# Patient Record
Sex: FEMALE | Race: WHITE | NOT HISPANIC OR LATINO | URBAN - NONMETROPOLITAN AREA
[De-identification: names, ages, dates, MRNs, and addresses within clinical notes are randomized per-mention and may not be internally consistent; named-entity substitution may affect disease eponyms.]

---

## 2018-12-28 NOTE — PATIENT DISCUSSION
New Prescription: erythromycin (erythromycin): ointment: 5 mg/gram (0.5 %) 1 a thin layer three times a day as directed into affected eye

## 2018-12-28 NOTE — PATIENT DISCUSSION
- Follow up in 3 weeks.  If no improvement strongly consider I&amp;D (would have to be external approach) or excisional biopsy

## 2018-12-28 NOTE — PATIENT DISCUSSION
New Prescription: Maxitrol (neomycin-polymyxin-dexameth): ointment: 3.5-10,000-0.1 mg-unit/g-% a small amount three times a day as directed into left eye 12-

## 2019-06-07 ENCOUNTER — IMPORTED ENCOUNTER (OUTPATIENT)
Dept: URBAN - NONMETROPOLITAN AREA CLINIC 1 | Facility: CLINIC | Age: 69
End: 2019-06-07

## 2019-06-07 PROBLEM — H01.021: Noted: 2019-06-07

## 2019-06-07 PROBLEM — H10.423: Noted: 2019-06-07

## 2019-06-07 PROBLEM — H43.813: Noted: 2018-12-07

## 2019-06-07 PROBLEM — H35.373: Noted: 2017-11-10

## 2019-06-07 PROBLEM — H16.223: Noted: 2019-06-07

## 2019-06-07 PROBLEM — H40.013: Noted: 2017-11-10

## 2019-06-07 PROCEDURE — 92250 FUNDUS PHOTOGRAPHY W/I&R: CPT

## 2019-06-07 PROCEDURE — 92014 COMPRE OPH EXAM EST PT 1/>: CPT

## 2019-06-07 NOTE — PATIENT DISCUSSION
Glaucoma suspect OU:-  Educated patient on condition-  Optos done today stable findings OU -  VF done previously OD reliable with inferior nasal step and OS reliable with inferior arcuate but stable from previous-  OCT done previously OD shows Superior NFL thinning and Borderline Inferior NFL thinning and OS shows Superior NFL thinning but stable from previous -  Gonio done previous grade IV with moderate pigment OU-  PACH done previously still noted to have thick corneas OU.  -  IOP 14 OU -  Cup to Disc noted at 0.65 OU -  Continue to monitor  ERM OU-  Educated patietn on condition-  Continue to use [employer] daily call or come in ASAP if changes in vision are noted from [facility] [place] [place]-  OCT done previously shows ERM OU but stable from previous-  Optos done today stable OU-  Continue to monitorPVD OU: -  Discussed findings of exam in detail with the patient. -  Patient has noted more floaters but no curtains or additional flashes of light. -  No holes tears or detachments noted on exam today (flat 360). -  The risk of retinal detachment in patients with PVDs was discussed with the patient and the warning signs of retinal detachment were carefully reviewed with the patient. -  The patient was warned to return to the office or contact the ophthalmologist on call immediately if they experience signs of retinal detachment or changes in vision noted from today.  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- Discussed diagnosis in detail with patient- Discussed signs and symptoms of progression- Recommend patient drinking plenty of water and starting Omega 3’s - Recommend Refresh or Systane  throughout the day- Consider Restasis or plugs in the future if no improvement- Samples of Refresh Hipolito 3's given today- Recommend J & J baby shampoo to scrub lids daily - Continue to monitorDermatchalasis OU-  Educated patient on condition-  No superior field of vision complaint at this time-  Continue to monitor for nowPseudophakia OU-  Discussed diagnosis in detail w/ pt today-  Stable and doing well-  s/p Yag PC OU open capsule noted-  Monitor yearly or PRNOcular Allergies/ANGEL OU-  Educated patient on condition. -  Signs/symptoms associated discussed-  Continue Alrex OU BID PRN handwritten Rx given today-  Continue Refresh PF at least BID OU-  Continue to monitor at 6 mo or if patient has problems prn.; 's Notes: RM  6/1/18 DEFERS 6/7/19DFE  11/10/17OCT disc 12/7/18OCT mac 12/7/18Optos 6/7/19VF  12/7/18Gonio 6/1/18PACH  11/15/16 - Lynchstereo disc

## 2019-09-16 ENCOUNTER — IMPORTED ENCOUNTER (OUTPATIENT)
Dept: URBAN - NONMETROPOLITAN AREA CLINIC 1 | Facility: CLINIC | Age: 69
End: 2019-09-16

## 2019-09-16 PROCEDURE — 92015 DETERMINE REFRACTIVE STATE: CPT

## 2019-09-16 NOTE — PATIENT DISCUSSION
Refraction Only - Discussed diagnosis in detail with patient- New glasses RX given today - Continue to monitor - RTC A/S  ------------------------------------previous notes---------------------------Glaucoma suspect OU:-  Educated patient on condition-  Optos done today stable findings OU -  VF done previously OD reliable with inferior nasal step and OS reliable with inferior arcuate but stable from previous-  OCT done previously OD shows Superior NFL thinning and Borderline Inferior NFL thinning and OS shows Superior NFL thinning but stable from previous -  Gonio done previous grade IV with moderate pigment OU-  PACH done previously still noted to have thick corneas OU.  -  IOP 14 OU -  Cup to Disc noted at 0.65 OU -  Continue to monitor  ERM OU-  Educated patietn on condition-  Continue to use [employer] daily call or come in ASAP if changes in vision are noted from [facility]-  OCT done previously shows ERM OU but stable from previous-  Optos done today stable OU-  Continue to monitorPVD OU: -  Discussed findings of exam in detail with the patient. -  Patient has noted more floaters but no curtains or additional flashes of light. -  No holes tears or detachments noted on exam today (flat 360). -  The risk of retinal detachment in patients with PVDs was discussed with the patient and the warning signs of retinal detachment were carefully reviewed with the patient. -  The patient was warned to return to the office or contact the ophthalmologist on call immediately if they experience signs of retinal detachment or changes in vision noted from today.  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- Discussed diagnosis in detail with patient- Discussed signs and symptoms of progression- Recommend patient drinking plenty of water and starting Omega 3’s - Recommend Refresh or Systane  throughout the day- Consider Restasis or plugs in the future if no improvement- Samples of Refresh Hipolito 3's given today- Recommend J & J baby shampoo to scrub lids daily - Continue to monitorDermatchalasis OU-  Educated patient on condition-  No superior field of vision complaint at this time-  Continue to monitor for nowPseudophakia OU-  Discussed diagnosis in detail w/ pt today-  Stable and doing well-  s/p Yag PC OU open capsule noted-  Monitor yearly or PRNOcular Allergies/ANGEL OU-  Educated patient on condition. -  Signs/symptoms associated discussed-  Continue Alrex OU BID PRN handwritten Rx given today-  Continue Refresh PF at least BID OU-  Continue to monitor at 6 mo or if patient has problems prn.; 's Notes: RM  6/1/18 DEFERS 6/7/19DFE  11/10/17OCT disc 12/7/18OCT mac 12/7/18Optos 6/7/19VF  12/7/18Gonio 6/1/18PACH  11/15/16 - Lynchstereo disc

## 2019-12-09 ENCOUNTER — IMPORTED ENCOUNTER (OUTPATIENT)
Dept: URBAN - NONMETROPOLITAN AREA CLINIC 1 | Facility: CLINIC | Age: 69
End: 2019-12-09

## 2019-12-09 PROCEDURE — 92133 CPTRZD OPH DX IMG PST SGM ON: CPT

## 2019-12-09 PROCEDURE — 92014 COMPRE OPH EXAM EST PT 1/>: CPT

## 2019-12-09 PROCEDURE — 92083 EXTENDED VISUAL FIELD XM: CPT

## 2019-12-09 NOTE — PATIENT DISCUSSION
Glaucoma suspect OU:-  Educated patient on condition-  Brother has glaucoma -  Optos done previously stable findings OU -  VF done today OD shows Fair field with Inferior Arcuate and OS shows Fair field with Inferior Arcuate Defect. OS shows slight progression-  OCT done today shows Superior NFL thinning. Shows progression -  Gonio done previous grade IV with moderate pigment OU-  PACH done previously still noted to have thick corneas OU.  -  IOP 14 OU -  Cup to Disc noted at 0.65 OU -  Will consider drop if any changes in pressures or testing -  Continue to monitor  -  RTC 4 months BL GL with Optos and Complete ERM OU-  Educated patietn on condition-  Continue to use [employer] daily call or come in ASAP if changes in vision are noted from [place]-  OCT done previously shows ERM OU but stable from previous-  Optos done previously stable OU-  Continue to monitorPVD OU: -  Discussed findings of exam in detail with the patient. -  Patient has noted more floaters but no curtains or additional flashes of light. -  No holes tears or detachments noted on exam today (flat 360). -  The risk of retinal detachment in patients with PVDs was discussed with the patient and the warning signs of retinal detachment were carefully reviewed with the patient. -  The patient was warned to return to the office or contact the ophthalmologist on call immediately if they experience signs of retinal detachment or changes in vision noted from today.  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- Discussed diagnosis in detail with patient- Discussed signs and symptoms of progression- Recommend patient drinking plenty of water and starting Omega 3’s - Recommend Refresh or Systane  throughout the day- Consider Restasis or plugs in the future if no improvement- Samples of Refresh Hipolito 3's given today- Recommend J & J baby shampoo to scrub lids daily - Continue to monitorDermatchalasis OU-  Educated patient on condition-  No superior field of vision complaint at this time-  Continue to monitor for nowPseudophakia OU-  Discussed diagnosis in detail w/ pt today-  Stable and doing well-  s/p Yag PC OU open capsule noted-  Monitor yearly or PRNOcular Allergies/ANGEL OU-  Educated patient on condition. -  Signs/symptoms associated discussed-  Continue Alrex OU BID PRN handwritten Rx given today-  Continue Refresh PF at least BID OU-  Continue to monitor at 6 mo or if patient has problems prn.; Dr's Notes: RM  6/1/18 DEFERS 6/7/19DFE  11/10/17OCT disc 12/9/19OCT mac 12/7/18Optos 6/7/19VF  12/9/19Gonio 6/1/18PACH  11/15/16 - Lynchstereo disc

## 2020-05-13 ENCOUNTER — IMPORTED ENCOUNTER (OUTPATIENT)
Dept: URBAN - NONMETROPOLITAN AREA CLINIC 1 | Facility: CLINIC | Age: 70
End: 2020-05-13

## 2020-05-13 PROCEDURE — 99214 OFFICE O/P EST MOD 30 MIN: CPT

## 2020-05-13 PROCEDURE — 92250 FUNDUS PHOTOGRAPHY W/I&R: CPT

## 2020-05-13 NOTE — PATIENT DISCUSSION
Glaucoma suspect OU:-  Educated patient on condition-  Brother has glaucoma -  Optos done today stable findings OU -  VF done previously OD shows Fair field with Inferior Arcuate and OS shows Fair field with Inferior Arcuate Defect. OS shows slight progression-  OCT done previously shows Superior NFL thinning. Shows progression -  Gonio done previous grade IV with moderate pigment OU-  PACH done previously still noted to have thick corneas OU.  -  IOP 14 OU-  Cup to Disc noted at 0.65 OU -  Will consider drop if any changes in pressures or testing -  Continue to monitor   ERM OU-  Educated patietn on condition-  Continue to use [employer] daily call or come in ASAP if changes in vision are noted from [place]-  OCT done previously shows ERM OU but stable from previous-  Optos done previously stable OU-  Continue to monitorPVD OU: -  Discussed findings of exam in detail with the patient. -  Patient has noted more floaters but no curtains or additional flashes of light. -  No holes tears or detachments noted on exam today (flat 360). -  The risk of retinal detachment in patients with PVDs was discussed with the patient and the warning signs of retinal detachment were carefully reviewed with the patient. -  The patient was warned to return to the office or contact the ophthalmologist on call immediately if they experience signs of retinal detachment or changes in vision noted from today. 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- Discussed diagnosis in detail with patient- Discussed signs and symptoms of progression- Recommend patient drinking plenty of water and starting Omega 3’s - Recommend Refresh or Systane  throughout the day- Consider Restasis or plugs in the future if no improvement- Samples of Refresh Hipolito 3's given today- Recommend J & J baby shampoo to scrub lids daily - Recommend Pataday BID OTC PRN - Continue to monitorDermatchalasis OU-  Educated patient on condition-  No superior field of vision complaint at this time-  Continue to monitor for nowPseudophakia OU-  Discussed diagnosis in detail w/ pt today-  Stable and doing well-  s/p Yag PC OU open capsule noted-  Monitor yearly or PRNOcular Allergies/ANGEL OU-  Educated patient on condition. -  Signs/symptoms associated discussed-  Continue Alrex OU BID PRN handwritten Rx given today-  Continue Refresh PF at least BID OU-  Continue to monitor at 6 mo or if patient has problems prn. Presbyopia OU -  Discussed diagnosis in detail wiht patient -  New glasses RX given today-  Continue to monitor; 's Notes: RM  5/13/20DFE  11/10/17OCT disc 12/9/19OCT mac 12/7/18Optos 5/13/20VF  12/9/19Gonio 6/1/18PACH  11/15/16 - Lynchstereo disc

## 2020-09-16 ENCOUNTER — IMPORTED ENCOUNTER (OUTPATIENT)
Dept: URBAN - NONMETROPOLITAN AREA CLINIC 1 | Facility: CLINIC | Age: 70
End: 2020-09-16

## 2020-09-16 PROCEDURE — 92014 COMPRE OPH EXAM EST PT 1/>: CPT

## 2020-09-16 PROCEDURE — 92133 CPTRZD OPH DX IMG PST SGM ON: CPT

## 2020-09-16 NOTE — PATIENT DISCUSSION
Glaucoma suspect OU:-  Educated patient on condition-  Brother has glaucoma -  Optos done prevously stable findings OU -  VF done previously OD shows Fair field with Inferior Arcuate and OS shows Fair field with Inferior Arcuate Defect. OS shows slight progression-  OCT done today shows Superior NFL thinning. Shows progression -  Gonio done previous grade IV with moderate pigment OU-  PACH done previously still noted to have thick corneas OU.  -  IOP 14 OU-  Cup to Disc noted at 0.65 OU -  Will consider drop if any changes in pressures or testing -  Continue to monitor   ERM OU-  Educated patietn on condition-  Continue to use [employer] daily call or come in ASAP if changes in vision are noted from [place]-  OCT done previously shows ERM OU but stable from previous-  Optos done previously stable OU-  Continue to monitorPVD OU: -  Discussed findings of exam in detail with the patient. -  Patient has noted more floaters but no curtains or additional flashes of light. -  No holes tears or detachments noted on exam today (flat 360). -  The risk of retinal detachment in patients with PVDs was discussed with the patient and the warning signs of retinal detachment were carefully reviewed with the patient. -  The patient was warned to return to the office or contact the ophthalmologist on call immediately if they experience signs of retinal detachment or changes in vision noted from today. 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- Discussed diagnosis in detail with patient- Discussed signs and symptoms of progression- Recommend patient drinking plenty of water and starting Omega 3’s - Recommend Refresh or Systane  throughout the day- Consider Restasis or plugs in the future if no improvement- Recommend J & J baby shampoo to scrub lids daily - Recommend Pataday BID OTC PRN - Continue to monitorDermatchalasis OU-  Educated patient on condition-  No superior field of vision complaint at this time-  Continue to monitor for nowPseudophakia OU-  Discussed diagnosis in detail w/ pt today-  s/p Yag PC OU open capsule noted-  Monitor yearly or PRNOcular Allergies/ANGEL OU-  Educated patient on condition. -  Signs/symptoms associated discussed-  Continue Alrex OU BID PRN handwritten Rx given today-  Continue Refresh PF at least BID OU-  Continue to monitor at 6 mo or if patient has problems prn. Presbyopia OU -  Discussed diagnosis in detail sylvia patient -  Continue to monitor; 's Notes: RM  5/13/20DFE  11/10/17OCT disc 9/16/20OCT mac 12/7/18Optos 5/13/20VF  12/9/19Gonio 6/1/18PACH  11/15/16 - Lynchstereo disc

## 2021-03-17 ENCOUNTER — IMPORTED ENCOUNTER (OUTPATIENT)
Dept: URBAN - NONMETROPOLITAN AREA CLINIC 1 | Facility: CLINIC | Age: 71
End: 2021-03-17

## 2021-03-17 PROCEDURE — 92133 CPTRZD OPH DX IMG PST SGM ON: CPT

## 2021-03-17 PROCEDURE — 99213 OFFICE O/P EST LOW 20 MIN: CPT

## 2021-03-17 NOTE — PATIENT DISCUSSION
Glaucoma suspect OU:-  Educated patient on condition-  Brother has glaucoma -  Optos done prevously stable findings OU -  VF done previously OD shows Fair field with Inferior Arcuate and OS shows Fair field with Inferior Arcuate Defect. OS shows slight progression-  OCT done today shows Superior NFL thinning. Shows progression -  Gonio done previous grade IV with moderate pigment OU-  PACH done previously still noted to have thick corneas OU.  -  IOP 14 OU-  Cup to Disc noted at 0.65 OU -  Will consider drop if any changes in pressures or testing -  Continue to monitor   ERM OU-  Educated patietn on condition-  Continue to use [employer] daily call or come in ASAP if changes in vision are noted from [place]-  OCT done previously shows ERM OU but stable from previous-  Optos done previously stable OU-  Continue to monitorPVD OU: -  Discussed findings of exam in detail with the patient. -  Patient has noted more floaters but no curtains or additional flashes of light. -  No holes tears or detachments noted on exam today (flat 360). -  The risk of retinal detachment in patients with PVDs was discussed with the patient and the warning signs of retinal detachment were carefully reviewed with the patient. -  The patient was warned to return to the office or contact the ophthalmologist on call immediately if they experience signs of retinal detachment or changes in vision noted from today. 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- Discussed diagnosis in detail with patient- Discussed signs and symptoms of progression- Recommend patient drinking plenty of water and starting Omega 3’s - Recommend Refresh or Systane  throughout the day- Consider Restasis or plugs in the future if no improvement- Recommend J & J baby shampoo to scrub lids daily - Recommend Pataday BID OTC PRN - Continue to monitorDermatchalasis OU-  Educated patient on condition-  No superior field of vision complaint at this time-  Continue to monitor for nowPseudophakia OU-  Discussed diagnosis in detail w/ pt today-  s/p Yag PC OU open capsule noted-  Monitor yearly or PRNOcular Allergies/ANGEL OU-  Educated patient on condition. -  Signs/symptoms associated discussed-  Continue Alrex OU BID PRN handwritten Rx given today-  Continue Refresh PF at least BID OU-  Continue to monitor at 6 mo or if patient has problems prn. Presbyopia OU -  Discussed diagnosis in detail sylvia patient -  Continue to monitor; 's Notes: RM  5/13/20DFE  11/10/17OCT disc 3/17/21OCT mac 12/7/18Optos 5/13/20VF  12/9/19Gonio 6/1/18PACH  11/15/16 - Karolstereo disc

## 2021-07-01 ENCOUNTER — IMPORTED ENCOUNTER (OUTPATIENT)
Dept: URBAN - NONMETROPOLITAN AREA CLINIC 1 | Facility: CLINIC | Age: 71
End: 2021-07-01

## 2021-07-01 PROBLEM — H01.021: Noted: 2021-07-01

## 2021-07-01 PROBLEM — H43.813: Noted: 2021-07-01

## 2021-07-01 PROBLEM — H40.013: Noted: 2021-07-01

## 2021-07-01 PROBLEM — H16.223: Noted: 2021-07-01

## 2021-07-01 PROBLEM — H10.423: Noted: 2021-07-01

## 2021-07-01 PROBLEM — H35.373: Noted: 2021-07-01

## 2021-07-01 PROCEDURE — 99212 OFFICE O/P EST SF 10 MIN: CPT

## 2021-07-01 NOTE — PATIENT DISCUSSION
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 OS shows slight progression-  OCT done today shows Superior NFL thinning. Shows progression -  Gonio done previous grade IV with moderate pigment OU-  PACH done previously still noted to have thick corneas OU.  -  IOP 14 OU-  Cup to Disc noted at 0.65 OU -  Will consider drop if any changes in pressures or testing -  Continue to monitor   ERM OU-  Educated patietn on condition-  Continue to use [employer] daily call or come in ASAP if changes in vision are noted from [facility] [place]-  OCT done previously shows ERM OU but stable from previous-  Optos done previously stable OU-  Continue to monitorPVD OU: -  Discussed findings of exam in detail with the patient. -  Patient has noted more floaters but no curtains or additional flashes of light. -  No holes tears or detachments noted on exam today (flat 360). -  The risk of retinal detachment in patients with PVDs was discussed with the patient and the warning signs of retinal detachment were carefully reviewed with the patient. -  The patient was warned to return to the office or contact the ophthalmologist on call immediately if they experience signs of retinal detachment or changes in vision noted from today. 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- Discussed diagnosis in detail with patient- Discussed signs and symptoms of progression- Recommend patient drinking plenty of water and starting Omega 3’s - Recommend Refresh or Systane  throughout the day- Consider Restasis or plugs in the future if no improvement- Recommend J & J baby shampoo to scrub lids daily - Recommend Pataday BID OTC PRN - Continue to monitorDermatchalasis OU-  Educated patient on condition-  No superior field of vision complaint at this time-  Continue to monitor for nowPseudophakia OU-  Discussed diagnosis in detail w/ pt today-  s/p Yag PC OU open capsule noted-  Monitor yearly or PRNOcular Allergies/ANGEL OU-  Educated patient on condition. -  Signs/symptoms associated discussed-  Continue Alrex OU BID PRN handwritten Rx given today-  Continue Refresh PF at least BID OU-  Continue to monitor at 6 mo or if patient has problems prn. Presbyopia OU -  Discussed diagnosis in detail wiht patient -  Continue to monitor; 's Notes: RM  5/13/20DFE  11/10/17OCT disc 3/17/21OCT mac 12/7/18Optos 5/13/20VF  12/9/19Gonio 6/1/18PACH  11/15/16 - Lynchstereo disc

## 2021-07-22 ENCOUNTER — IMPORTED ENCOUNTER (OUTPATIENT)
Dept: URBAN - NONMETROPOLITAN AREA CLINIC 1 | Facility: CLINIC | Age: 71
End: 2021-07-22

## 2021-07-22 PROBLEM — H01.021: Noted: 2021-07-22

## 2021-07-22 PROBLEM — H35.373: Noted: 2021-11-23

## 2021-07-22 PROBLEM — H43.813: Noted: 2021-07-22

## 2021-07-22 PROBLEM — H40.013: Noted: 2021-07-22

## 2021-07-22 PROBLEM — H35.373: Noted: 2021-07-22

## 2021-07-22 PROBLEM — H16.223: Noted: 2021-07-22

## 2021-07-22 PROBLEM — H52.4: Noted: 2021-07-22

## 2021-07-22 PROBLEM — H40.013: Noted: 2021-11-23

## 2021-07-22 PROCEDURE — 92083 EXTENDED VISUAL FIELD XM: CPT

## 2021-07-22 PROCEDURE — 99213 OFFICE O/P EST LOW 20 MIN: CPT

## 2021-07-22 NOTE — PATIENT DISCUSSION
Glaucoma suspect OU:-  Educated patient on condition-  Brother has glaucoma -  Optos done prevously stable findings OU -  VF done today OD shows good field with Borderline Inferior Arcuate and OS shows Fair field with Inferior Arcuate -  OCT done previously shows Superior NFL thinning. Shows progression -  Gonio done previous grade IV with moderate pigment OU-  PACH done previously still noted to have thick corneas OU.  -  IOP 14 OU-  Cup to Disc noted at 0.65 OU -  Will consider drop if any changes in pressures or testing -  Continue to monitor   ERM OU-  Educated patietn on condition-  Continue to use [employer] daily call or come in ASAP if changes in vision are noted from [place]-  OCT done previously shows ERM OU but stable from previous-  Optos done previously stable OU-  Continue to monitorPVD OU: -  Discussed findings of exam in detail with the patient. -  Patient has noted more floaters but no curtains or additional flashes of light. -  No holes tears or detachments noted on exam today (flat 360). -  The risk of retinal detachment in patients with PVDs was discussed with the patient and the warning signs of retinal detachment were carefully reviewed with the patient. -  The patient was warned to return to the office or contact the ophthalmologist on call immediately if they experience signs of retinal detachment or changes in vision noted from today. 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- Discussed diagnosis in detail with patient- Discussed signs and symptoms of progression- Recommend patient drinking plenty of water and starting Omega 3’s - Recommend Refresh or Systane  throughout the day- Recommend J & J baby shampoo to scrub lids daily - Recommend Pataday BID OTC PRN - Continue to monitorDermatchalasis OU-  Educated patient on condition-  No superior field of vision complaint at this time-  Continue to monitor for nowPseudophakia OU-  Discussed diagnosis in detail w/ pt today-  s/p Yag PC OU open capsule noted-  Monitor yearly or PRNOcular Allergies/ANGEL OU-  Educated patient on condition. -  Signs/symptoms associated discussed-  History of Alrex -  Continue Refresh PF at least BID OU-  Continue to monitor at 6 mo or if patient has problems prn. Presbyopia OU -  Discussed diagnosis in detail wiht patient -  Continue to monitor; 's Notes: RM  5/13/20DFE  11/10/17OCT disc 3/17/21OCT mac 12/7/18Optos 5/13/20VF 7/22/21Gonio 6/1/18PACH  11/15/16 - Lynchstereo disc

## 2021-11-23 ENCOUNTER — IMPORTED ENCOUNTER (OUTPATIENT)
Dept: URBAN - NONMETROPOLITAN AREA CLINIC 1 | Facility: CLINIC | Age: 71
End: 2021-11-23

## 2021-11-23 PROBLEM — H52.4: Noted: 2021-07-22

## 2021-11-23 PROBLEM — H01.021: Noted: 2021-07-22

## 2021-11-23 PROBLEM — H16.223: Noted: 2021-07-22

## 2021-11-23 PROBLEM — H01.024: Noted: 2021-07-22

## 2021-11-23 PROBLEM — H35.373: Noted: 2021-11-23

## 2021-11-23 PROBLEM — H43.813: Noted: 2021-07-22

## 2021-11-23 PROCEDURE — 99214 OFFICE O/P EST MOD 30 MIN: CPT

## 2021-11-23 PROCEDURE — 92134 CPTRZ OPH DX IMG PST SGM RTA: CPT

## 2021-11-23 NOTE — PATIENT DISCUSSION
Glaucoma suspect OU:-  Educated patient on condition-  Brother has glaucoma -  Optos done prevously stable findings OU -  VF done previously OD shows good field with Borderline Inferior Arcuate and OS shows Fair field with Inferior Arcuate -  OCT done previously shows Superior NFL thinning. Shows progression -  Gonio done previous grade IV with moderate pigment OU-  PACH done previously still noted to have thick corneas OU.  -  IOP OD 12 and OS 14-  Cup to Disc noted at 0.65 OU -  Will consider drop if any changes in pressures or testing -  Continue to monitor   ERM OU-  Educated patietn on condition-  Continue to use [employer] daily call or come in ASAP if changes in vision are noted from [place]-  OCT done today shows ERM OU but stable from previous-  Optos done previously stable OU-  Continue to monitorPVD OU: -  Discussed findings of exam in detail with the patient. -  Patient has noted more floaters but no curtains or additional flashes of light. -  No holes tears or detachments noted on exam today (flat 360). -  The risk of retinal detachment in patients with PVDs was discussed with the patient and the warning signs of retinal detachment were carefully reviewed with the patient. -  The patient was warned to return to the office or contact the ophthalmologist on call immediately if they experience signs of retinal detachment or changes in vision noted from today. 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- Discussed diagnosis in detail with patient- Discussed signs and symptoms of progression- Recommend patient drinking plenty of water and starting Omega 3’s - Recommend Refresh or Systane  throughout the day- Recommend J [employer] J baby shampoo to scrub lids daily - Recommend Pataday BID OTC PRN - Continue to monitorDermatchalasis OU-  Educated patient on condition-  No superior field of vision complaint at this time-  Continue to monitor for nowPseudophakia OU-  Discussed diagnosis in detail w/ pt today-  s/p Yag PC OU open capsule noted-  Monitor yearly or PRNOcular Allergies/ANGEL OU-  Educated patient on condition. -  Signs/symptoms associated discussed-  History of Alrex -  Continue Refresh PF at least BID OU-  Continue to monitor at 6 mo or if patient has problems prn. Presbyopia OU -  Discussed diagnosis in detail wiht patient -  Continue to monitor; 's Notes: RM  5/13/20DFE  11/10/17OCT disc 3/17/21OCT mac 11/23/21Optos 5/13/20VF 7/22/21Gonio 6/1/18PACH  11/15/16 - Lynchstereo disc

## 2022-04-09 ASSESSMENT — TONOMETRY
OS_IOP_MMHG: 14
OD_IOP_MMHG: 14
OS_IOP_MMHG: 14
OD_IOP_MMHG: 14
OS_IOP_MMHG: 14
OD_IOP_MMHG: 14
OD_IOP_MMHG: 14
OD_IOP_MMHG: 12
OD_IOP_MMHG: 14
OD_IOP_MMHG: 14
OS_IOP_MMHG: 14
OD_IOP_MMHG: 14

## 2022-04-09 ASSESSMENT — PACHYMETRY
OD_CT_UM: 614; ADJ: THICK
OS_CT_UM: 646; ADJ: THICK
OD_CT_UM: 614; ADJ: THICK
OS_CT_UM: 646; ADJ: THICK
OD_CT_UM: 614; ADJ: THICK
OS_CT_UM: 646; ADJ: THICK
OD_CT_UM: 614; ADJ: THICK
OS_CT_UM: 646; ADJ: THICK
OS_CT_UM: 646; ADJ: THICK
OD_CT_UM: 614; ADJ: THICK
OD_CT_UM: 614; ADJ: THICK
OS_CT_UM: 646; ADJ: THICK
OS_CT_UM: 646; ADJ: THICK

## 2022-04-09 ASSESSMENT — VISUAL ACUITY
OD_SC: 20/20
OD_SC: 20/20
OD_SC: 20/20-
OS_SC: 20/25-
OS_SC: 20/20-
OD_SC: 20/20
OS_SC: 20/20 SLOW
OS_SC: 20/20
OU_CC: 20/20
OS_SC: 20/20
OD_CC: 20/25-
OD_SC: 20/20-
OS_SC: 20/20
OS_SC: 20/25
OD_SC: 20/20
OD_SC: 20/20
OD_SC: 20/20-1
OS_CC: 20/20-
OS_SC: 20/20-

## 2022-05-23 ENCOUNTER — FOLLOW UP (OUTPATIENT)
Dept: URBAN - NONMETROPOLITAN AREA CLINIC 1 | Facility: CLINIC | Age: 72
End: 2022-05-23

## 2022-05-23 DIAGNOSIS — H52.4: ICD-10-CM

## 2022-05-23 DIAGNOSIS — H40.1131: ICD-10-CM

## 2022-05-23 DIAGNOSIS — H35.373: ICD-10-CM

## 2022-05-23 PROCEDURE — 99214 OFFICE O/P EST MOD 30 MIN: CPT

## 2022-05-23 PROCEDURE — 92133 CPTRZD OPH DX IMG PST SGM ON: CPT

## 2022-05-23 PROCEDURE — 92083 EXTENDED VISUAL FIELD XM: CPT

## 2022-05-23 PROCEDURE — 92015 DETERMINE REFRACTIVE STATE: CPT

## 2022-05-23 RX ORDER — LATANOPROST 50 UG/ML: 1 SOLUTION/ DROPS OPHTHALMIC EVERY EVENING

## 2022-05-23 ASSESSMENT — TONOMETRY
OD_IOP_MMHG: 16
OS_IOP_MMHG: 16

## 2022-05-23 ASSESSMENT — VISUAL ACUITY
OS_CC: 20/20-2
OD_CC: 20/20

## 2022-05-23 NOTE — PATIENT DISCUSSION
VF done today shows possible progression OS. VF done previously:  OD shows good field with Borderline Inferior Arcuate and OS shows Fair field with Inferior Arcuate.

## 2022-05-23 NOTE — PATIENT DISCUSSION
Discussed findings w/ pt today. Up until today she was monitored as a suspect; however, based on VF and OCT results today I feel there may have been some changes/progression.

## 2022-06-27 ENCOUNTER — FOLLOW UP (OUTPATIENT)
Dept: URBAN - NONMETROPOLITAN AREA CLINIC 1 | Facility: CLINIC | Age: 72
End: 2022-06-27

## 2022-06-27 DIAGNOSIS — H40.1131: ICD-10-CM

## 2022-06-27 PROCEDURE — 99213 OFFICE O/P EST LOW 20 MIN: CPT

## 2022-06-27 ASSESSMENT — TONOMETRY
OD_IOP_MMHG: 12
OS_IOP_MMHG: 12

## 2022-06-27 ASSESSMENT — VISUAL ACUITY
OD_CC: 20/20
OS_CC: 20/30

## 2022-06-27 NOTE — PATIENT DISCUSSION
VF done previously shows possible progression OS. VF done previously:  OD shows good field with Borderline Inferior Arcuate and OS shows Fair field with Inferior Arcuate.

## 2022-10-31 ENCOUNTER — FOLLOW UP (OUTPATIENT)
Dept: URBAN - NONMETROPOLITAN AREA CLINIC 1 | Facility: CLINIC | Age: 72
End: 2022-10-31

## 2022-10-31 DIAGNOSIS — H40.1131: ICD-10-CM

## 2022-10-31 PROCEDURE — 92250 FUNDUS PHOTOGRAPHY W/I&R: CPT

## 2022-10-31 PROCEDURE — 99214 OFFICE O/P EST MOD 30 MIN: CPT

## 2022-10-31 ASSESSMENT — VISUAL ACUITY
OS_CC: 20/25-1
OD_CC: 20/20

## 2022-10-31 ASSESSMENT — TONOMETRY
OS_IOP_MMHG: 14
OD_IOP_MMHG: 12

## 2023-05-01 ENCOUNTER — FOLLOW UP (OUTPATIENT)
Dept: URBAN - NONMETROPOLITAN AREA CLINIC 1 | Facility: CLINIC | Age: 73
End: 2023-05-01

## 2023-05-01 DIAGNOSIS — H40.1122: ICD-10-CM

## 2023-05-01 DIAGNOSIS — H40.1111: ICD-10-CM

## 2023-05-01 DIAGNOSIS — H35.373: ICD-10-CM

## 2023-05-01 PROCEDURE — 92083 EXTENDED VISUAL FIELD XM: CPT

## 2023-05-01 PROCEDURE — 92134 CPTRZ OPH DX IMG PST SGM RTA: CPT

## 2023-05-01 PROCEDURE — 92133 CPTRZD OPH DX IMG PST SGM ON: CPT

## 2023-05-01 PROCEDURE — 99214 OFFICE O/P EST MOD 30 MIN: CPT

## 2023-05-01 ASSESSMENT — VISUAL ACUITY
OU_CC: 20/20
OD_CC: 20/20-2
OS_CC: 20/22-1

## 2023-05-01 ASSESSMENT — TONOMETRY
OS_IOP_MMHG: 13
OD_IOP_MMHG: 13

## 2023-11-03 ENCOUNTER — FOLLOW UP (OUTPATIENT)
Dept: URBAN - NONMETROPOLITAN AREA CLINIC 1 | Facility: CLINIC | Age: 73
End: 2023-11-03

## 2023-11-03 DIAGNOSIS — H35.373: ICD-10-CM

## 2023-11-03 PROCEDURE — 99214 OFFICE O/P EST MOD 30 MIN: CPT

## 2023-11-03 PROCEDURE — 92134 CPTRZ OPH DX IMG PST SGM RTA: CPT

## 2023-11-03 ASSESSMENT — TONOMETRY
OS_IOP_MMHG: 15
OD_IOP_MMHG: 15

## 2023-11-03 ASSESSMENT — VISUAL ACUITY
OS_CC: 20/30
OU_CC: 20/20-2
OD_CC: 20/20-2

## 2024-05-13 ENCOUNTER — FOLLOW UP (OUTPATIENT)
Dept: URBAN - NONMETROPOLITAN AREA CLINIC 1 | Facility: CLINIC | Age: 74
End: 2024-05-13

## 2024-05-13 DIAGNOSIS — H40.1122: ICD-10-CM

## 2024-05-13 DIAGNOSIS — H40.1111: ICD-10-CM

## 2024-05-13 PROCEDURE — 92133 CPTRZD OPH DX IMG PST SGM ON: CPT

## 2024-05-13 PROCEDURE — 99214 OFFICE O/P EST MOD 30 MIN: CPT

## 2024-05-13 PROCEDURE — 92083 EXTENDED VISUAL FIELD XM: CPT

## 2024-05-13 ASSESSMENT — VISUAL ACUITY
OU_SC: 20/20-1
OD_SC: 20/30
OS_SC: 20/30-1

## 2024-05-13 ASSESSMENT — TONOMETRY
OD_IOP_MMHG: 14
OS_IOP_MMHG: 14

## 2025-01-21 ENCOUNTER — FOLLOW UP (OUTPATIENT)
Age: 75
End: 2025-01-21

## 2025-01-21 DIAGNOSIS — H40.1122: ICD-10-CM

## 2025-01-21 DIAGNOSIS — H40.1111: ICD-10-CM

## 2025-01-21 PROCEDURE — 99214 OFFICE O/P EST MOD 30 MIN: CPT

## 2025-01-21 PROCEDURE — 92250 FUNDUS PHOTOGRAPHY W/I&R: CPT

## 2025-07-07 ENCOUNTER — FOLLOW UP (OUTPATIENT)
Age: 75
End: 2025-07-07

## 2025-07-07 DIAGNOSIS — H40.1122: ICD-10-CM

## 2025-07-07 DIAGNOSIS — H35.373: ICD-10-CM

## 2025-07-07 DIAGNOSIS — H40.1111: ICD-10-CM

## 2025-07-07 DIAGNOSIS — H43.813: ICD-10-CM

## 2025-07-07 DIAGNOSIS — H16.223: ICD-10-CM

## 2025-07-07 PROCEDURE — 92133 CPTRZD OPH DX IMG PST SGM ON: CPT

## 2025-07-07 PROCEDURE — 99213 OFFICE O/P EST LOW 20 MIN: CPT

## 2025-07-07 PROCEDURE — 92083 EXTENDED VISUAL FIELD XM: CPT
